# Patient Record
Sex: MALE | Race: OTHER | HISPANIC OR LATINO | URBAN - METROPOLITAN AREA
[De-identification: names, ages, dates, MRNs, and addresses within clinical notes are randomized per-mention and may not be internally consistent; named-entity substitution may affect disease eponyms.]

---

## 2017-02-22 ENCOUNTER — IMPORTED ENCOUNTER (OUTPATIENT)
Dept: URBAN - METROPOLITAN AREA CLINIC 50 | Facility: CLINIC | Age: 77
End: 2017-02-22

## 2017-02-22 NOTE — PATIENT DISCUSSION
(T54.883) Vitreous degeneration, bilateral - Assesment : Examination revealed PVD OU. - Plan : Monitor for changes. Advised patient to call our office with decreased vision or an increase in flashes and/or floaters.

## 2017-02-22 NOTE — PATIENT DISCUSSION
(N03.187) Keratoconjunct sicca, not specified as Sjogren's, bilateral - Assesment : Examination revealed Dry Eye Syndrome OU. OD>OS. - Plan : Monitor for changes. Advised patient to call our office with decreased vision or increased symptoms. Explained to patient that cause of discomfort and symptoms in the AM could be caused by dryness. Recommended GenTeal Gel Drops OU QHS to increase comfort in the mornings. Given sample today. Also recommends warm compresses OU 2-3x/week to improve symptoms.

## 2017-02-22 NOTE — PATIENT DISCUSSION
(A16.769) Other secondary cataract, bilateral - Assesment : Significant posterior capsule opacification present OU. - Plan : Explained to patient that this could contribute to glare and trouble with lighting. Discussed that opacity is the cause of the decreased vision. Discussed the risks and benefits of performing a YAG Capsulotomy including the risk of floaters, retinal detachment, and retinal swelling. Patient understands to expect floaters after the YAG capsulotomy procedures and understands that they may remain indefinitely. Patient is only here until March 1, so would like to defer treatment until next visit.

## 2017-04-10 ENCOUNTER — IMPORTED ENCOUNTER (OUTPATIENT)
Dept: URBAN - METROPOLITAN AREA CLINIC 50 | Facility: CLINIC | Age: 77
End: 2017-04-10

## 2017-04-12 ENCOUNTER — IMPORTED ENCOUNTER (OUTPATIENT)
Dept: URBAN - METROPOLITAN AREA CLINIC 50 | Facility: CLINIC | Age: 77
End: 2017-04-12

## 2017-05-01 ENCOUNTER — IMPORTED ENCOUNTER (OUTPATIENT)
Dept: URBAN - METROPOLITAN AREA CLINIC 50 | Facility: CLINIC | Age: 77
End: 2017-05-01

## 2017-08-14 ENCOUNTER — IMPORTED ENCOUNTER (OUTPATIENT)
Dept: URBAN - METROPOLITAN AREA CLINIC 50 | Facility: CLINIC | Age: 77
End: 2017-08-14

## 2018-01-16 ENCOUNTER — IMPORTED ENCOUNTER (OUTPATIENT)
Dept: URBAN - METROPOLITAN AREA CLINIC 50 | Facility: CLINIC | Age: 78
End: 2018-01-16

## 2018-01-18 NOTE — PATIENT DISCUSSION
(C28.848) Dermatochalasis of left lower eyelid - Assesment : Examination revealed Dermatochalasis - Plan : TYPE OF SURGERY: BLLB / LSR      I counseled the patient regarding the following: Johnie Runner / Sunil Cedeno. Recovery after surgery and LSR discussed with patient. R/B/A to surgery discussed with patient, including, but not limited to: scar, pain, bleeding, infection, loss of vision, double vision, asymmetry, over correction, under correction, poor cosmetic outcome, difficulty closing eyes, dry eye, dependence on lubricating drops postoperatively. Patient understands R/B/A and would like to proceed with surgery in the near future.

## 2018-01-18 NOTE — PATIENT DISCUSSION
(E08.839) Dermatochalasis of left upper eyelid - Assesment : Examination revealed Dermatochalasis-COSMETIC  H/O BULB 20+ YEARS AGO.  - Plan : CONSIDER BOTOX INJECTIONS IN FUTURE

## 2018-01-18 NOTE — PATIENT DISCUSSION
(S63.095) Dermatochalasis of right upper eyelid - Assesment : Examination revealed Dermatochalasis-COSMETIC  H/O BULB 20+ YEARS AGO.   BROW PTOSIS CONTRIBUTING TO DERMATOCHALASIS AND APPEARANCE OF DERMATOCHALASIS.  MAY CONSIDER BOTOX INJECTIONS AROUND BROWS TO SEE IF THE BOTOX IS SUCCESSFUL AT ELEVATING THE BROWS. - Plan : CONSIDER BOTOX INJECTIONS IN FUTURE

## 2018-02-12 ENCOUNTER — IMPORTED ENCOUNTER (OUTPATIENT)
Dept: URBAN - METROPOLITAN AREA CLINIC 50 | Facility: CLINIC | Age: 78
End: 2018-02-12

## 2018-07-23 ENCOUNTER — IMPORTED ENCOUNTER (OUTPATIENT)
Dept: URBAN - METROPOLITAN AREA CLINIC 50 | Facility: CLINIC | Age: 78
End: 2018-07-23

## 2018-07-23 NOTE — PATIENT DISCUSSION
"""IOP Elevated OU. Stop Timolol 0.5% both eyes .  Start Lumigan both eyes at [facility]

## 2018-07-23 NOTE — PATIENT DISCUSSION
""" """"Follow ERM w/o surgery. Call if vision decreases or distortion increases. Recommend regular Amsler checks.  """

## 2018-07-26 ENCOUNTER — IMPORTED ENCOUNTER (OUTPATIENT)
Dept: URBAN - METROPOLITAN AREA CLINIC 50 | Facility: CLINIC | Age: 78
End: 2018-07-26

## 2018-08-13 ENCOUNTER — IMPORTED ENCOUNTER (OUTPATIENT)
Dept: URBAN - METROPOLITAN AREA CLINIC 50 | Facility: CLINIC | Age: 78
End: 2018-08-13

## 2018-08-30 ENCOUNTER — IMPORTED ENCOUNTER (OUTPATIENT)
Dept: URBAN - METROPOLITAN AREA CLINIC 50 | Facility: CLINIC | Age: 78
End: 2018-08-30

## 2018-10-29 ENCOUNTER — IMPORTED ENCOUNTER (OUTPATIENT)
Dept: URBAN - METROPOLITAN AREA CLINIC 50 | Facility: CLINIC | Age: 78
End: 2018-10-29

## 2021-05-14 ASSESSMENT — TONOMETRY
OD_IOP_MMHG: 16
OD_IOP_MMHG: 18
OS_IOP_MMHG: 15
OS_IOP_MMHG: 18
OS_IOP_MMHG: 14
OD_IOP_MMHG: 18
OS_IOP_MMHG: 17
OD_IOP_MMHG: 17
OS_IOP_MMHG: 15
OS_IOP_MMHG: 18
OD_IOP_MMHG: 18
OD_IOP_MMHG: 14
OS_IOP_MMHG: 14
OS_IOP_MMHG: 19
OD_IOP_MMHG: 15
OD_IOP_MMHG: 16
OS_IOP_MMHG: 19
OD_IOP_MMHG: 15
OS_IOP_MMHG: 19
OD_IOP_MMHG: 15

## 2021-05-14 ASSESSMENT — VISUAL ACUITY
OS_CC: 20/25+
OS_CC: 20/25
OS_CC: 20/25
OD_CC: 20/25-
OS_CC: J1+
OD_CC: 20/25
OD_CC: 20/25+
OD_CC: 20/20
OS_BAT: 20/50
OD_OTHER: 20/40. 20/70.
OS_CC: 20/20
OD_BAT: 20/40
OS_CC: 20/20-1
OD_CC: 20/25
OD_CC: 20/25+
OS_CC: 20/20
OD_CC: J1+
OS_OTHER: 20/50. 20/70.

## 2021-05-14 ASSESSMENT — PACHYMETRY
OS_CT_UM: 462
OD_CT_UM: 477
OD_CT_UM: 477
OS_CT_UM: 462
OD_CT_UM: 477
OS_CT_UM: 462
OD_CT_UM: 477
OS_CT_UM: 462
OD_CT_UM: 477
OS_CT_UM: 462
OD_CT_UM: 477
OD_CT_UM: 477
